# Patient Record
Sex: MALE | NOT HISPANIC OR LATINO | Employment: FULL TIME | URBAN - METROPOLITAN AREA
[De-identification: names, ages, dates, MRNs, and addresses within clinical notes are randomized per-mention and may not be internally consistent; named-entity substitution may affect disease eponyms.]

---

## 2018-12-18 ENCOUNTER — APPOINTMENT (OUTPATIENT)
Dept: RADIOLOGY | Age: 58
End: 2018-12-18
Payer: OTHER MISCELLANEOUS

## 2018-12-18 ENCOUNTER — APPOINTMENT (OUTPATIENT)
Dept: URGENT CARE | Age: 58
End: 2018-12-18
Payer: OTHER MISCELLANEOUS

## 2018-12-18 ENCOUNTER — TRANSCRIBE ORDERS (OUTPATIENT)
Dept: ADMINISTRATIVE | Age: 58
End: 2018-12-18

## 2018-12-18 DIAGNOSIS — T14.90XA INJURY: ICD-10-CM

## 2018-12-18 DIAGNOSIS — T14.90XA INJURY: Primary | ICD-10-CM

## 2018-12-18 PROCEDURE — G0383 LEV 4 HOSP TYPE B ED VISIT: HCPCS | Performed by: PREVENTIVE MEDICINE

## 2018-12-18 PROCEDURE — 73080 X-RAY EXAM OF ELBOW: CPT

## 2018-12-18 PROCEDURE — 99284 EMERGENCY DEPT VISIT MOD MDM: CPT | Performed by: PREVENTIVE MEDICINE

## 2018-12-21 ENCOUNTER — APPOINTMENT (OUTPATIENT)
Dept: URGENT CARE | Age: 58
End: 2018-12-21
Payer: OTHER MISCELLANEOUS

## 2018-12-21 PROCEDURE — 99213 OFFICE O/P EST LOW 20 MIN: CPT | Performed by: PREVENTIVE MEDICINE

## 2018-12-27 ENCOUNTER — APPOINTMENT (OUTPATIENT)
Dept: URGENT CARE | Age: 58
End: 2018-12-27
Payer: OTHER MISCELLANEOUS

## 2018-12-27 PROCEDURE — 99213 OFFICE O/P EST LOW 20 MIN: CPT | Performed by: PREVENTIVE MEDICINE

## 2024-07-11 ENCOUNTER — OFFICE VISIT (OUTPATIENT)
Dept: SURGERY | Facility: CLINIC | Age: 64
End: 2024-07-11
Payer: COMMERCIAL

## 2024-07-11 VITALS
HEIGHT: 74 IN | HEART RATE: 88 BPM | WEIGHT: 190 LBS | DIASTOLIC BLOOD PRESSURE: 76 MMHG | BODY MASS INDEX: 24.38 KG/M2 | SYSTOLIC BLOOD PRESSURE: 145 MMHG

## 2024-07-11 DIAGNOSIS — Z95.2 HISTORY OF AORTIC VALVE REPLACEMENT: ICD-10-CM

## 2024-07-11 DIAGNOSIS — K40.90 LEFT INGUINAL HERNIA: Primary | ICD-10-CM

## 2024-07-11 PROCEDURE — 99244 OFF/OP CNSLTJ NEW/EST MOD 40: CPT | Performed by: SURGERY

## 2024-07-11 RX ORDER — WARFARIN SODIUM 5 MG/1
TABLET ORAL
COMMUNITY
Start: 2024-06-27

## 2024-07-11 RX ORDER — PRAVASTATIN SODIUM 80 MG/1
80 TABLET ORAL DAILY
COMMUNITY
Start: 2024-06-13

## 2024-07-11 NOTE — LETTER
July 11, 2024     Vanessa Eckert MD  [address] [facility]  [address]  [facility] [address]    Patient: Faisal Garrido   YOB: 1960   Date of Visit: 7/11/2024       Dear Dr. Eckert:    Thank you for referring Faisal Garrido to me for evaluation. Below are my notes for this consultation.    If you have questions, please do not hesitate to call me. I look forward to following your patient along with you.         Sincerely,        Krystian Hyde, DO        CC: MD Krystian Alonso,   7/11/2024 10:31 AM  Sign when Signing Visit  Assessment/Plan:    Diagnoses and all orders for this visit:    Left inguinal hernia    History of aortic valve replacement    Other orders  -     pravastatin (PRAVACHOL) 80 mg tablet; Take 80 mg by mouth daily  -     warfarin (COUMADIN) 5 mg tablet; TAKE 1.5 TABLETS (7.5 MG TOTAL) BY MOUTH NIGHTLY    Discussed risks and benefits of an open left inguinal hernia repair including the potential of bleeding/hematoma, recurrence, spermatic cord injury, or pain issues and he agrees to proceed.  Favor an open repair as opposed to robotic to assure good hemostasis given his underlying anticoagulation use.    He follows with a Dr. Gianfranco Colmenares at [facility] burning cardiac status and his AVR.  Dr. Colmenares has managed bridging Lovenox in the past for other procedures.  I have asked Mr. Nguyen to reach out to Dr. Colmenares for anticoagulation management.  Ideally would like him to hold the Coumadin for at least 3 days preoperatively.    Will need updated CBC, BMP, EKG.    Subjective:      Patient ID: Faisal Garrido is a 64 y.o. male.    Patient presents for left inguinal hernia consult.  States he has had a bulge and intermittent sharp pain LLQ for 2 weeks.  Does not limit his activities.    States the lump disappears overnight when sleeping but returns with some achiness as the day progresses.    Had an aortic valve replacement in 1999.  Follows with a   "Gianfranco Colmenares at [facility] in [place].  He is on Coumadin for this.  He states he has been doing quite well over the last 25 years with this.  Does follow in [place] every 6 months.  Discussed pending cardiac clearance from that office        The following portions of the patient's history were reviewed and updated as appropriate:     He  has a past medical history of GERD (gastroesophageal reflux disease).  He  has a past surgical history that includes Cardiac valve replacement.  His family history is not on file.  He  reports that he has never smoked. He does not have any smokeless tobacco history on file. He reports that he does not drink alcohol and does not use drugs.  Current Outpatient Medications   Medication Sig Dispense Refill   • pravastatin (PRAVACHOL) 80 mg tablet Take 80 mg by mouth daily     • warfarin (COUMADIN) 5 mg tablet TAKE 1.5 TABLETS (7.5 MG TOTAL) BY MOUTH NIGHTLY       No current facility-administered medications for this visit.     He has No Known Allergies..    Review of Systems   Gastrointestinal:  Positive for abdominal pain.   All other systems reviewed and are negative.        Objective:      /76   Pulse 88   Ht 6' 2\" (1.88 m)   Wt 86.2 kg (190 lb)   BMI 24.39 kg/m²          Physical Exam  Constitutional:       General: He is not in acute distress.  HENT:      Head: Atraumatic.      Nose: Nose normal.      Mouth/Throat:      Mouth: Mucous membranes are moist.   Eyes:      Extraocular Movements: Extraocular movements intact.   Cardiovascular:      Rate and Rhythm: Normal rate.   Pulmonary:      Effort: Pulmonary effort is normal.   Abdominal:      General: Abdomen is flat.      Palpations: Abdomen is soft.      Hernia: A hernia (Left inguinal hernia noted on a standing examination.  No signs of a right inguinal nor umbilical hernia) is present.   Musculoskeletal:      Cervical back: Normal range of motion.   Skin:     General: Skin is warm and dry.   Neurological: "      Mental Status: He is alert.

## 2024-07-11 NOTE — PROGRESS NOTES
Assessment/Plan:    Diagnoses and all orders for this visit:    Left inguinal hernia    History of aortic valve replacement    Other orders  -     pravastatin (PRAVACHOL) 80 mg tablet; Take 80 mg by mouth daily  -     warfarin (COUMADIN) 5 mg tablet; TAKE 1.5 TABLETS (7.5 MG TOTAL) BY MOUTH NIGHTLY    Discussed risks and benefits of an open left inguinal hernia repair including the potential of bleeding/hematoma, recurrence, spermatic cord injury, or pain issues and he agrees to proceed.  Favor an open repair as opposed to robotic to assure good hemostasis given his underlying anticoagulation use.    He follows with a Dr. Gianfranco Colmenares at [facility] burning cardiac status and his AVR.  Dr. Colmenares has managed bridging Lovenox in the past for other procedures.  I have asked Mr. Nguyen to reach out to Dr. Colmenares for anticoagulation management.  Ideally would like him to hold the Coumadin for at least 3 days preoperatively.    Will need updated CBC, BMP, EKG.    Subjective:      Patient ID: Faisal Garrido is a 64 y.o. male.    Patient presents for left inguinal hernia consult.  States he has had a bulge and intermittent sharp pain LLQ for 2 weeks.  Does not limit his activities.    States the lump disappears overnight when sleeping but returns with some achiness as the day progresses.    Had an aortic valve replacement in 1999.  Follows with a Dr. Gianfranco Colmenares at [facility] in [place].  He is on Coumadin for this.  He states he has been doing quite well over the last 25 years with this.  Does follow in [place] every 6 months.  Discussed pending cardiac clearance from that office        The following portions of the patient's history were reviewed and updated as appropriate:     He  has a past medical history of GERD (gastroesophageal reflux disease).  He  has a past surgical history that includes Cardiac valve replacement.  His family history is not on file.  He  reports that he has never  "smoked. He does not have any smokeless tobacco history on file. He reports that he does not drink alcohol and does not use drugs.  Current Outpatient Medications   Medication Sig Dispense Refill    pravastatin (PRAVACHOL) 80 mg tablet Take 80 mg by mouth daily      warfarin (COUMADIN) 5 mg tablet TAKE 1.5 TABLETS (7.5 MG TOTAL) BY MOUTH NIGHTLY       No current facility-administered medications for this visit.     He has No Known Allergies..    Review of Systems   Gastrointestinal:  Positive for abdominal pain.   All other systems reviewed and are negative.        Objective:      /76   Pulse 88   Ht 6' 2\" (1.88 m)   Wt 86.2 kg (190 lb)   BMI 24.39 kg/m²          Physical Exam  Constitutional:       General: He is not in acute distress.  HENT:      Head: Atraumatic.      Nose: Nose normal.      Mouth/Throat:      Mouth: Mucous membranes are moist.   Eyes:      Extraocular Movements: Extraocular movements intact.   Cardiovascular:      Rate and Rhythm: Normal rate.   Pulmonary:      Effort: Pulmonary effort is normal.   Abdominal:      General: Abdomen is flat.      Palpations: Abdomen is soft.      Hernia: A hernia (Left inguinal hernia noted on a standing examination.  No signs of a right inguinal nor umbilical hernia) is present.   Musculoskeletal:      Cervical back: Normal range of motion.   Skin:     General: Skin is warm and dry.   Neurological:      Mental Status: He is alert.         "

## 2024-10-01 ENCOUNTER — TELEPHONE (OUTPATIENT)
Age: 64
End: 2024-10-01

## 2024-10-02 ENCOUNTER — OFFICE VISIT (OUTPATIENT)
Dept: SURGERY | Facility: CLINIC | Age: 64
End: 2024-10-02
Payer: COMMERCIAL

## 2024-10-02 DIAGNOSIS — K40.90 LEFT INGUINAL HERNIA: Primary | ICD-10-CM

## 2024-10-02 DIAGNOSIS — Z95.2 HISTORY OF AORTIC VALVE REPLACEMENT: ICD-10-CM

## 2024-10-02 PROCEDURE — 99214 OFFICE O/P EST MOD 30 MIN: CPT | Performed by: SURGERY

## 2024-10-02 NOTE — ASSESSMENT & PLAN NOTE
He has been managed by Dr. Gianfranco Colmenares at [facility] regarding his aortic valve replacement.  He states Lovenox bridging is typically recommended for any type of procedure.  He did message Dr. Colmenares and is awaiting his response regarding guidance for Lovenox bridging.  I also requested a cardiac clearance     Patient will need CBC, BMP, EKG preoperatively.

## 2024-10-02 NOTE — LETTER
2024     Vanessa Eckert MD   [facility]  [address]  [facility] [address]    Patient: Faisal Garrido   YOB: 1960   Date of Visit: 10/2/2024       Dear Dr. Eckert:    Thank you for referring Faisal Garrido to me for evaluation. Below are my notes for this consultation.    If you have questions, please do not hesitate to call me. I look forward to following your patient along with you.         Sincerely,        Krystian Hyde DO        CC: MD Krystian Alonso DO  10/2/2024  4:52 PM  Sign when Signing Visit  Ambulatory Visit  Name: Faisal Garrido      : 1960      MRN: [id number]  Encounter Provider: Krystian Hyde DO  Encounter Date: 10/2/2024   Encounter department: [facility] GENERAL SURGERY [facility]    Assessment & Plan  Left inguinal hernia  Reviewed risks and benefits of a open left inguinal hernia repair including potential for recurrence, spermatic cord injury, or pain issues and he agrees to proceed.  Given his anticoagulation status I favor open versus robotic repair       History of aortic valve replacement  He has been managed by Dr. Gianfranco Colmenares at [facility] regarding his aortic valve replacement.  He states Lovenox bridging is typically recommended for any type of procedure.  He did message Dr. Colmenares and is awaiting his response regarding guidance for Lovenox bridging.  I also requested a cardiac clearance     Patient will need CBC, BMP, EKG preoperatively.    History of Present Illness    Faisal Garrido is a 64 y.o. male who presents for follow up on left inguinal hernia to discuss repair.  No particular change since I last saw him in July.  He only messaged Dr. Colmenares this morning and has not heard back from him.    Stressed the need to hear from Dr. Colmenares regarding cardiac clearance as well as instructions for Lovenox bridging (usually handled through that office) regarding his Coumadin and AVR.    History  obtained from : patient  Review of Systems   All other systems reviewed and are negative.    Medical History Reviewed by provider this encounter:           Objective    There were no vitals taken for this visit.    Physical Exam  Constitutional:       General: He is not in acute distress.  HENT:      Head: Atraumatic.      Mouth/Throat:      Mouth: Mucous membranes are moist.   Eyes:      Extraocular Movements: Extraocular movements intact.   Cardiovascular:      Rate and Rhythm: Normal rate.   Pulmonary:      Effort: Pulmonary effort is normal.   Abdominal:      General: Abdomen is flat.      Palpations: Abdomen is soft.      Hernia: A hernia (Soft reducible left inguinal hernia.) is present.   Musculoskeletal:      Cervical back: Normal range of motion.   Skin:     General: Skin is warm and dry.   Neurological:      Mental Status: He is alert.

## 2024-10-02 NOTE — H&P (VIEW-ONLY)
Ambulatory Visit  Name: Faisal Garrido      : 1960      MRN: [id number]  Encounter Provider: Krystian Hyde DO  Encounter Date: 10/2/2024   Encounter department: [facility] GENERAL SURGERY [facility]    Assessment & Plan  Left inguinal hernia  Reviewed risks and benefits of a open left inguinal hernia repair including potential for recurrence, spermatic cord injury, or pain issues and he agrees to proceed.  Given his anticoagulation status I favor open versus robotic repair       History of aortic valve replacement  He has been managed by Dr. Gianfranco Colmenares at [facility] regarding his aortic valve replacement.  He states Lovenox bridging is typically recommended for any type of procedure.  He did message Dr. Colmenares and is awaiting his response regarding guidance for Lovenox bridging.  I also requested a cardiac clearance     Patient will need CBC, BMP, EKG preoperatively.    History of Present Illness     Faisal Garrido is a 64 y.o. male who presents for follow up on left inguinal hernia to discuss repair.  No particular change since I last saw him in July.  He only messaged Dr. Colmenares this morning and has not heard back from him.    Stressed the need to hear from Dr. Colmenares regarding cardiac clearance as well as instructions for Lovenox bridging (usually handled through that office) regarding his Coumadin and AVR.    History obtained from : patient  Review of Systems   All other systems reviewed and are negative.    Medical History Reviewed by provider this encounter:           Objective     There were no vitals taken for this visit.    Physical Exam  Constitutional:       General: He is not in acute distress.  HENT:      Head: Atraumatic.      Mouth/Throat:      Mouth: Mucous membranes are moist.   Eyes:      Extraocular Movements: Extraocular movements intact.   Cardiovascular:      Rate and Rhythm: Normal rate.   Pulmonary:      Effort: Pulmonary effort is normal.   Abdominal:       General: Abdomen is flat.      Palpations: Abdomen is soft.      Hernia: A hernia (Soft reducible left inguinal hernia.) is present.   Musculoskeletal:      Cervical back: Normal range of motion.   Skin:     General: Skin is warm and dry.   Neurological:      Mental Status: He is alert.

## 2024-10-02 NOTE — ASSESSMENT & PLAN NOTE
Reviewed risks and benefits of a open left inguinal hernia repair including potential for recurrence, spermatic cord injury, or pain issues and he agrees to proceed.  Given his anticoagulation status I favor open versus robotic repair

## 2024-10-02 NOTE — PROGRESS NOTES
Ambulatory Visit  Name: Faisal Garrido      : 1960      MRN: [id number]  Encounter Provider: Krystian Hyde DO  Encounter Date: 10/2/2024   Encounter department: [facility] GENERAL SURGERY [facility]    Assessment & Plan  Left inguinal hernia  Reviewed risks and benefits of a open left inguinal hernia repair including potential for recurrence, spermatic cord injury, or pain issues and he agrees to proceed.  Given his anticoagulation status I favor open versus robotic repair       History of aortic valve replacement  He has been managed by Dr. Gianfranco Colmenares at [facility] regarding his aortic valve replacement.  He states Lovenox bridging is typically recommended for any type of procedure.  He did message Dr. Colmenares and is awaiting his response regarding guidance for Lovenox bridging.  I also requested a cardiac clearance     Patient will need CBC, BMP, EKG preoperatively.    History of Present Illness     Faisal Garrido is a 64 y.o. male who presents for follow up on left inguinal hernia to discuss repair.  No particular change since I last saw him in July.  He only messaged Dr. Colmenares this morning and has not heard back from him.    Stressed the need to hear from Dr. Colmenares regarding cardiac clearance as well as instructions for Lovenox bridging (usually handled through that office) regarding his Coumadin and AVR.    History obtained from : patient  Review of Systems   All other systems reviewed and are negative.    Medical History Reviewed by provider this encounter:           Objective     There were no vitals taken for this visit.    Physical Exam  Constitutional:       General: He is not in acute distress.  HENT:      Head: Atraumatic.      Mouth/Throat:      Mouth: Mucous membranes are moist.   Eyes:      Extraocular Movements: Extraocular movements intact.   Cardiovascular:      Rate and Rhythm: Normal rate.   Pulmonary:      Effort: Pulmonary effort is normal.   Abdominal:       General: Abdomen is flat.      Palpations: Abdomen is soft.      Hernia: A hernia (Soft reducible left inguinal hernia.) is present.   Musculoskeletal:      Cervical back: Normal range of motion.   Skin:     General: Skin is warm and dry.   Neurological:      Mental Status: He is alert.

## 2024-10-03 ENCOUNTER — PREP FOR PROCEDURE (OUTPATIENT)
Dept: SURGERY | Facility: CLINIC | Age: 64
End: 2024-10-03

## 2024-10-03 DIAGNOSIS — K40.90 INGUINAL HERNIA: Primary | ICD-10-CM

## 2024-10-16 ENCOUNTER — ANESTHESIA EVENT (OUTPATIENT)
Dept: PERIOP | Facility: HOSPITAL | Age: 64
End: 2024-10-16
Payer: COMMERCIAL

## 2024-10-17 ENCOUNTER — NURSE TRIAGE (OUTPATIENT)
Age: 64
End: 2024-10-17

## 2024-10-17 RX ORDER — FERROUS GLUCONATE 324(38)MG
324 TABLET ORAL
COMMUNITY

## 2024-10-17 RX ORDER — DIPHENOXYLATE HYDROCHLORIDE AND ATROPINE SULFATE 2.5; .025 MG/1; MG/1
1 TABLET ORAL DAILY
COMMUNITY

## 2024-10-17 NOTE — TELEPHONE ENCOUNTER
"Pt called in. Pt scheduled for Procedure: REPAIR HERNIA INGUINAL (Left: Groin) on 10/25/24 with Dr. Hyde. Pt was mailed cardiology clearance and asking if he can drop it off at the office tomorrow morning around 9:30am. Advised Pt that he can.     Reason for Disposition   Information only question and nurse able to answer    Answer Assessment - Initial Assessment Questions  1. REASON FOR CALL or QUESTION: \"What is your reason for calling today?\" or \"How can I best help you?\" or \"What question do you have that I can help answer?\"      How to drop off pre-op cardiology clearance    Protocols used: Information Only Call - No Triage-ADULT-[place]    "

## 2024-10-17 NOTE — PRE-PROCEDURE INSTRUCTIONS
Pre-Surgery Instructions:   Medication Instructions    Enoxaparin Sodium (LOVENOX SC) Hold x 4 days prior to sx. Starting Lovenox x 3 days prior-    ferrous gluconate (FERGON) 324 mg tablet Hold day of surgery.    Flaxseed, Linseed, (FLAXSEED OIL PO) Stop taking 7 days prior to surgery.    multivitamin (THERAGRAN) TABS Stop taking 7 days prior to surgery.    pravastatin (PRAVACHOL) 80 mg tablet Take night before surgery    warfarin (COUMADIN) 5 mg tablet Hold x 4 days prior to sx. Starting Lovenox x 3 days prior-      Medication instructions for day surgery reviewed. Please use only a sip of water to take your instructed medications. Avoid all over the counter vitamins, supplements and NSAIDS for one week prior to surgery per anesthesia guidelines. Tylenol is ok to take as needed.     You will receive a call one business day prior to surgery with an arrival time and hospital directions. If your surgery is scheduled on a Monday, the hospital will be calling you on the Friday prior to your surgery. If you have not heard from anyone by 8pm, please call the hospital supervisor through the hospital  at [phone]. ([place] [phone] or [place] [phone]).    Do not eat or drink anything after midnight the night before your surgery, including candy, mints, lifesavers, or chewing gum. Do not drink alcohol 24hrs before your surgery. Try not to smoke at least 24hrs before your surgery.       Follow the pre surgery showering instructions as listed in the “My Surgical Experience Booklet” or otherwise provided by your surgeon's office. Do not use a blade to shave the surgical area 1 week before surgery. It is okay to use a clean electric clippers up to 24 hours before surgery. Do not apply any lotions, creams, including makeup, cologne, deodorant, or perfumes after showering on the day of your surgery. Do not use dry shampoo, hair spray, hair gel, or any type of hair products.     No contact lenses, eye  make-up, or artificial eyelashes. Remove nail polish, including gel polish, and any artificial, gel, or acrylic nails if possible. Remove all jewelry including rings and body piercing jewelry.     Wear causal clothing that is easy to take on and off. Consider your type of surgery.    Keep any valuables, jewelry, piercings at home. Please bring any specially ordered equipment (sling, braces) if indicated.    Arrange for a responsible person to drive you to and from the hospital on the day of your surgery. Please confirm the visitor policy for the day of your procedure when you receive your phone call with an arrival time.     Call the surgeon's office with any new illnesses, exposures, or additional questions prior to surgery.    Please reference your “My Surgical Experience Booklet” for additional information to prepare for your upcoming surgery.

## 2024-10-18 ENCOUNTER — TELEPHONE (OUTPATIENT)
Age: 64
End: 2024-10-18

## 2024-10-20 ENCOUNTER — LAB (OUTPATIENT)
Dept: LAB | Age: 64
End: 2024-10-20
Payer: COMMERCIAL

## 2024-10-20 ENCOUNTER — APPOINTMENT (OUTPATIENT)
Dept: LAB | Age: 64
End: 2024-10-20
Payer: COMMERCIAL

## 2024-10-20 DIAGNOSIS — K40.90 INGUINAL HERNIA: ICD-10-CM

## 2024-10-20 LAB
ANION GAP SERPL CALCULATED.3IONS-SCNC: 6 MMOL/L (ref 4–13)
BUN SERPL-MCNC: 15 MG/DL (ref 5–25)
CALCIUM SERPL-MCNC: 8.7 MG/DL (ref 8.4–10.2)
CHLORIDE SERPL-SCNC: 106 MMOL/L (ref 96–108)
CO2 SERPL-SCNC: 29 MMOL/L (ref 21–32)
CREAT SERPL-MCNC: 0.78 MG/DL (ref 0.6–1.3)
ERYTHROCYTE [DISTWIDTH] IN BLOOD BY AUTOMATED COUNT: 12.7 % (ref 11.6–15.1)
GFR SERPL CREATININE-BSD FRML MDRD: 96 ML/MIN/1.73SQ M
GLUCOSE SERPL-MCNC: 83 MG/DL (ref 65–140)
HCT VFR BLD AUTO: 41.7 % (ref 36.5–49.3)
HGB BLD-MCNC: 13.5 G/DL (ref 12–17)
MCH RBC QN AUTO: 31.5 PG (ref 26.8–34.3)
MCHC RBC AUTO-ENTMCNC: 32.4 G/DL (ref 31.4–37.4)
MCV RBC AUTO: 97 FL (ref 82–98)
PLATELET # BLD AUTO: 226 THOUSANDS/UL (ref 149–390)
PMV BLD AUTO: 11.6 FL (ref 8.9–12.7)
POTASSIUM SERPL-SCNC: 4.1 MMOL/L (ref 3.5–5.3)
RBC # BLD AUTO: 4.29 MILLION/UL (ref 3.88–5.62)
SODIUM SERPL-SCNC: 141 MMOL/L (ref 135–147)
WBC # BLD AUTO: 5.47 THOUSAND/UL (ref 4.31–10.16)

## 2024-10-20 PROCEDURE — 80048 BASIC METABOLIC PNL TOTAL CA: CPT

## 2024-10-20 PROCEDURE — 36415 COLL VENOUS BLD VENIPUNCTURE: CPT

## 2024-10-20 PROCEDURE — 85027 COMPLETE CBC AUTOMATED: CPT

## 2024-10-20 PROCEDURE — 93005 ELECTROCARDIOGRAM TRACING: CPT

## 2024-10-22 DIAGNOSIS — K40.90 LEFT INGUINAL HERNIA: Primary | ICD-10-CM

## 2024-10-22 LAB
ATRIAL RATE: 80 BPM
PR INTERVAL: 182 MS
QRS AXIS: 137 DEGREES
QRSD INTERVAL: 64 MS
QT INTERVAL: 352 MS
QTC INTERVAL: 405 MS
T WAVE AXIS: 139 DEGREES
VENTRICULAR RATE: 80 BPM

## 2024-10-22 PROCEDURE — 93010 ELECTROCARDIOGRAM REPORT: CPT | Performed by: INTERNAL MEDICINE

## 2024-10-22 RX ORDER — TAMSULOSIN HYDROCHLORIDE 0.4 MG/1
0.4 CAPSULE ORAL
Qty: 10 CAPSULE | Refills: 0 | Status: SHIPPED | OUTPATIENT
Start: 2024-10-22

## 2024-10-22 NOTE — PROGRESS NOTES
Prescription for Flomax sent to his pharmacy ([facility]) in anticipation for hernia surgery on 10/25.  I did leave a voicemail with him regarding this medication.  Also saw clearance letter and Lovenox instructions from his cardiologist it has been scanned into his EPIC chart

## 2024-10-25 ENCOUNTER — HOSPITAL ENCOUNTER (OUTPATIENT)
Facility: HOSPITAL | Age: 64
Setting detail: OUTPATIENT SURGERY
Discharge: HOME | End: 2024-10-25
Attending: SURGERY | Admitting: SURGERY
Payer: COMMERCIAL

## 2024-10-25 ENCOUNTER — ANESTHESIA (OUTPATIENT)
Dept: PERIOP | Facility: HOSPITAL | Age: 64
End: 2024-10-25
Payer: COMMERCIAL

## 2024-10-25 VITALS
HEIGHT: 74 IN | HEART RATE: 72 BPM | SYSTOLIC BLOOD PRESSURE: 124 MMHG | TEMPERATURE: 97.3 F | RESPIRATION RATE: 18 BRPM | WEIGHT: 190 LBS | DIASTOLIC BLOOD PRESSURE: 53 MMHG | OXYGEN SATURATION: 98 % | BODY MASS INDEX: 24.38 KG/M2

## 2024-10-25 DIAGNOSIS — K40.90 LEFT INGUINAL HERNIA: Primary | ICD-10-CM

## 2024-10-25 LAB
APTT PPP: 32 SECONDS (ref 23–34)
INR PPP: 1.12 (ref 0.85–1.19)
PROTHROMBIN TIME: 15.2 SECONDS (ref 12.3–15)

## 2024-10-25 PROCEDURE — 49505 PRP I/HERN INIT REDUC >5 YR: CPT | Performed by: SURGERY

## 2024-10-25 PROCEDURE — 49505 PRP I/HERN INIT REDUC >5 YR: CPT | Performed by: PHYSICIAN ASSISTANT

## 2024-10-25 PROCEDURE — 85730 THROMBOPLASTIN TIME PARTIAL: CPT | Performed by: ANESTHESIOLOGY

## 2024-10-25 PROCEDURE — C1781 MESH (IMPLANTABLE): HCPCS | Performed by: SURGERY

## 2024-10-25 PROCEDURE — 85610 PROTHROMBIN TIME: CPT | Performed by: ANESTHESIOLOGY

## 2024-10-25 DEVICE — PERFIX PLUG, EXTRA LARGE (CONTENTS: 2)
Type: IMPLANTABLE DEVICE | Site: INGUINAL | Status: FUNCTIONAL
Brand: PERFIX

## 2024-10-25 RX ORDER — ONDANSETRON 2 MG/ML
4 INJECTION INTRAMUSCULAR; INTRAVENOUS ONCE AS NEEDED
Status: DISCONTINUED | OUTPATIENT
Start: 2024-10-25 | End: 2024-10-25 | Stop reason: HOSPADM

## 2024-10-25 RX ORDER — MIDAZOLAM HYDROCHLORIDE 2 MG/2ML
INJECTION, SOLUTION INTRAMUSCULAR; INTRAVENOUS AS NEEDED
Status: DISCONTINUED | OUTPATIENT
Start: 2024-10-25 | End: 2024-10-25

## 2024-10-25 RX ORDER — HYDROMORPHONE HCL/PF 1 MG/ML
0.5 SYRINGE (ML) INJECTION
Status: DISCONTINUED | OUTPATIENT
Start: 2024-10-25 | End: 2024-10-25 | Stop reason: HOSPADM

## 2024-10-25 RX ORDER — ONDANSETRON 2 MG/ML
4 INJECTION INTRAMUSCULAR; INTRAVENOUS EVERY 4 HOURS PRN
Status: DISCONTINUED | OUTPATIENT
Start: 2024-10-25 | End: 2024-10-25 | Stop reason: HOSPADM

## 2024-10-25 RX ORDER — PROPOFOL 10 MG/ML
INJECTION, EMULSION INTRAVENOUS AS NEEDED
Status: DISCONTINUED | OUTPATIENT
Start: 2024-10-25 | End: 2024-10-25

## 2024-10-25 RX ORDER — MAGNESIUM HYDROXIDE 1200 MG/15ML
LIQUID ORAL AS NEEDED
Status: DISCONTINUED | OUTPATIENT
Start: 2024-10-25 | End: 2024-10-25 | Stop reason: HOSPADM

## 2024-10-25 RX ORDER — FENTANYL CITRATE/PF 50 MCG/ML
25 SYRINGE (ML) INJECTION
Status: DISCONTINUED | OUTPATIENT
Start: 2024-10-25 | End: 2024-10-25 | Stop reason: HOSPADM

## 2024-10-25 RX ORDER — BUPIVACAINE HYDROCHLORIDE AND EPINEPHRINE 2.5; 5 MG/ML; UG/ML
INJECTION, SOLUTION EPIDURAL; INFILTRATION; INTRACAUDAL; PERINEURAL AS NEEDED
Status: DISCONTINUED | OUTPATIENT
Start: 2024-10-25 | End: 2024-10-25 | Stop reason: HOSPADM

## 2024-10-25 RX ORDER — ONDANSETRON 2 MG/ML
INJECTION INTRAMUSCULAR; INTRAVENOUS AS NEEDED
Status: DISCONTINUED | OUTPATIENT
Start: 2024-10-25 | End: 2024-10-25

## 2024-10-25 RX ORDER — SODIUM CHLORIDE, SODIUM LACTATE, POTASSIUM CHLORIDE, CALCIUM CHLORIDE 600; 310; 30; 20 MG/100ML; MG/100ML; MG/100ML; MG/100ML
125 INJECTION, SOLUTION INTRAVENOUS CONTINUOUS
Status: DISCONTINUED | OUTPATIENT
Start: 2024-10-25 | End: 2024-10-25 | Stop reason: HOSPADM

## 2024-10-25 RX ORDER — OXYCODONE HYDROCHLORIDE 5 MG/1
5 TABLET ORAL EVERY 4 HOURS PRN
Status: DISCONTINUED | OUTPATIENT
Start: 2024-10-25 | End: 2024-10-25 | Stop reason: HOSPADM

## 2024-10-25 RX ORDER — OXYCODONE HYDROCHLORIDE 5 MG/1
5 TABLET ORAL EVERY 4 HOURS PRN
Qty: 10 TABLET | Refills: 0 | Status: SHIPPED | OUTPATIENT
Start: 2024-10-25 | End: 2024-11-04

## 2024-10-25 RX ORDER — LIDOCAINE HYDROCHLORIDE 10 MG/ML
INJECTION, SOLUTION EPIDURAL; INFILTRATION; INTRACAUDAL; PERINEURAL AS NEEDED
Status: DISCONTINUED | OUTPATIENT
Start: 2024-10-25 | End: 2024-10-25

## 2024-10-25 RX ORDER — DEXAMETHASONE SODIUM PHOSPHATE 10 MG/ML
INJECTION, SOLUTION INTRAMUSCULAR; INTRAVENOUS AS NEEDED
Status: DISCONTINUED | OUTPATIENT
Start: 2024-10-25 | End: 2024-10-25

## 2024-10-25 RX ORDER — FENTANYL CITRATE 50 UG/ML
INJECTION, SOLUTION INTRAMUSCULAR; INTRAVENOUS AS NEEDED
Status: DISCONTINUED | OUTPATIENT
Start: 2024-10-25 | End: 2024-10-25

## 2024-10-25 RX ORDER — SODIUM CHLORIDE, SODIUM LACTATE, POTASSIUM CHLORIDE, CALCIUM CHLORIDE 600; 310; 30; 20 MG/100ML; MG/100ML; MG/100ML; MG/100ML
INJECTION, SOLUTION INTRAVENOUS CONTINUOUS PRN
Status: DISCONTINUED | OUTPATIENT
Start: 2024-10-25 | End: 2024-10-25

## 2024-10-25 RX ORDER — CEFAZOLIN SODIUM 2 G/50ML
2000 SOLUTION INTRAVENOUS ONCE
Status: COMPLETED | OUTPATIENT
Start: 2024-10-25 | End: 2024-10-25

## 2024-10-25 RX ADMIN — FENTANYL CITRATE 25 MCG: 50 INJECTION INTRAMUSCULAR; INTRAVENOUS at 12:09

## 2024-10-25 RX ADMIN — PROPOFOL 170 MG: 10 INJECTION, EMULSION INTRAVENOUS at 11:07

## 2024-10-25 RX ADMIN — DEXAMETHASONE SODIUM PHOSPHATE 10 MG: 10 INJECTION, SOLUTION INTRAMUSCULAR; INTRAVENOUS at 11:09

## 2024-10-25 RX ADMIN — ONDANSETRON 4 MG: 2 INJECTION INTRAMUSCULAR; INTRAVENOUS at 11:07

## 2024-10-25 RX ADMIN — FENTANYL CITRATE 25 MCG: 50 INJECTION INTRAMUSCULAR; INTRAVENOUS at 12:03

## 2024-10-25 RX ADMIN — FENTANYL CITRATE 25 MCG: 50 INJECTION INTRAMUSCULAR; INTRAVENOUS at 11:54

## 2024-10-25 RX ADMIN — FENTANYL CITRATE 25 MCG: 50 INJECTION INTRAMUSCULAR; INTRAVENOUS at 11:25

## 2024-10-25 RX ADMIN — OXYCODONE HYDROCHLORIDE 5 MG: 5 TABLET ORAL at 12:58

## 2024-10-25 RX ADMIN — FENTANYL CITRATE 25 MCG: 50 INJECTION INTRAMUSCULAR; INTRAVENOUS at 11:17

## 2024-10-25 RX ADMIN — SODIUM CHLORIDE, SODIUM LACTATE, POTASSIUM CHLORIDE, AND CALCIUM CHLORIDE: .6; .31; .03; .02 INJECTION, SOLUTION INTRAVENOUS at 11:03

## 2024-10-25 RX ADMIN — MIDAZOLAM 2 MG: 1 INJECTION INTRAMUSCULAR; INTRAVENOUS at 11:03

## 2024-10-25 RX ADMIN — CEFAZOLIN SODIUM 2000 MG: 2 SOLUTION INTRAVENOUS at 11:09

## 2024-10-25 RX ADMIN — FENTANYL CITRATE 25 MCG: 50 INJECTION INTRAMUSCULAR; INTRAVENOUS at 11:07

## 2024-10-25 RX ADMIN — LIDOCAINE HYDROCHLORIDE 50 MG: 10 INJECTION, SOLUTION EPIDURAL; INFILTRATION; INTRACAUDAL at 11:07

## 2024-10-25 NOTE — ANESTHESIA PREPROCEDURE EVALUATION
Procedure:  REPAIR HERNIA INGUINAL (Left: Groin)    Relevant Problems   CARDIO   (+) History of aortic valve replacement        Physical Exam    Airway    Mallampati score: II  TM Distance: >3 FB  Neck ROM: full     Dental       Cardiovascular  Cardiovascular exam normal    Pulmonary  Pulmonary exam normal     Other Findings        Anesthesia Plan  ASA Score- 3     Anesthesia Type- general with ASA Monitors.         Additional Monitors:     Airway Plan: LMA.           Plan Factors-Exercise tolerance (METS): >4 METS.    Chart reviewed. EKG reviewed.  Existing labs reviewed. Patient summary reviewed.    Patient is not a current smoker.  Patient did not smoke on day of surgery.    Obstructive sleep apnea risk education given perioperatively.        Induction- intravenous.    Postoperative Plan- Plan for postoperative opioid use. Planned trial extubation    Perioperative Resuscitation Plan - Level 1 - Full Code.       Informed Consent- Anesthetic plan and risks discussed with patient and spouse.  I personally reviewed this patient with the CRNA. Discussed and agreed on the Anesthesia Plan with the CRNA..

## 2024-10-25 NOTE — ANESTHESIA POSTPROCEDURE EVALUATION
Post-Op Assessment Note    CV Status:  Stable  Pain Score: 0    Pain management: adequate       Mental Status:  Sleepy   Hydration Status:  Euvolemic   PONV Controlled:  Controlled   Airway Patency:  Patent     Post Op Vitals Reviewed: Yes    No anethesia notable event occurred.    Staff: CRNA           Last Filed PACU Vitals:  Vitals Value Taken Time   Temp     Pulse 84    /64    Resp 14    SpO2 97%        Modified Sandra:  No data recorded

## 2024-10-25 NOTE — ANESTHESIA POSTPROCEDURE EVALUATION
Post-Op Assessment Note          Two or more mitigation strategies used for obstructive sleep apnea  No anethesia notable event occurred.    Staff: Anesthesiologist           Last Filed PACU Vitals:  Vitals Value Taken Time   Temp 97.5 °F (36.4 °C) 10/25/24 1230   Pulse 68 10/25/24 1240   /58 10/25/24 1230   Resp 9 10/25/24 1240   SpO2 96 % 10/25/24 1240   Vitals shown include unfiled device data.    Modified Sandra:  Activity: 2 (10/25/2024  2:00 PM)  Respiration: 2 (10/25/2024  2:00 PM)  Circulation: 2 (10/25/2024  2:00 PM)  Consciousness: 2 (10/25/2024  2:00 PM)  Oxygen Saturation: 2 (10/25/2024  2:00 PM)  Modified Sandra Score: 10 (10/25/2024  2:00 PM)

## 2024-10-25 NOTE — OP NOTE
OPERATIVE REPORT  PATIENT NAME: Faisal Garrido    :  1960  MRN: [id number]  Pt Location: [facility] 04    SURGERY DATE: 10/25/2024    Surgeons and Role:     * Krystian Hyde, DO - Primary     * Genna Morgan PA-C - Assisting  I was present for the entire procedure. A qualified resident physician was not available, and a physician assistant was necessary to provide expert assistance in the form of providing optimal exposure with retraction, suturing, and assistance with dissection in order to perform the most efficient operation and in order to optimize patient safety in the absence of a qualified surgical resident.      Preop Diagnosis:  Inguinal hernia [K40.90], left    Post-Op Diagnosis Codes:     * Inguinal hernia [K40.90], left, indirect    Procedure(s):  Left - REPAIR HERNIA INGUINAL with extra-large plug and patch    Specimen(s):  * No specimens in log *    Estimated Blood Loss:   Minimal    Drains:  * No LDAs found *    Anesthesia Type:   General/local    Operative Indications:  Inguinal hernia [K40.90]      Operative Findings:  Left indirect inguinal hernia.  Height 74 inches weight 86 kg / 190 pounds.  BMI 24.  ASA 3.  Wound class I      Complications:   None    Procedure and Technique:  Patient was brought the operative suite and identified by visualization, conversation, by armband.  Sequential compression pumps were placed.  He was given Ancef perioperatively.  Once under anesthesia left groin was prepped and draped in a sterile fashion.  Timeout was performed and was assured that the prep was dry.  Local was instilled over the left inguinal canal.  An oblique skin incision was made and the subcutaneous tissues were divided with hot cautery to visualize the external oblique fascia.  External ring was noted and this fascia was opened up to the level of the internal ring.  Venkatesh retractors placed for exposure.  Cremasterics fibers were then skeletonized off the cord structures.   Cord structures were encircled with a Penrose drain.  Moderately sized cord lipoma as well as an indirect sac were noted and these were dissected out in a high fashion.  They were reduced into the internal ring defect.  This defect was developed with a 4 x 4 sponge to allow room for the plug.  Extra-large plug was placed into the defect.  Inner leads were anchored to each side of the transversalis tissues with 2-0 Vicryl suture.  Defect was then closed on top of the plug.  Onlay mesh was then placed on the floor of the canal likely 2 Vicryl to the pubic tubercle, shelving edge, and conjoined tendon.  Did extend the keyhole incision to allow room for the cord structures.  The 2 tails were brought around the cord and anchored to themselves as well as the underlying transversalis tissues with 2-0 Vicryl.  Irrigations carried out.  Local was instilled external bleak fascia was then reapproximated on top of the cord with a running 2-0 Vicryl suture.  Luis's was reapproximated with simple 2-0 Vicryl sutures.  Skin was closed using 4-0 Monocryl in a subcuticular fashion.  Wounds and washed and dried.  Sterile skin glue was applied.  He was assured the testicles in the scrotum at the completion the case.  He was awakened in the operating room and returned to the recovery area in stable condition having tolerated the procedure well.   I was present for the entire procedure.    Patient Disposition:  PACU              SIGNATURE: Krystian Hyde DO  DATE: October 25, 2024  TIME: 11:44 AM

## 2024-10-25 NOTE — INTERVAL H&P NOTE
H&P reviewed. After examining the patient I find no changes in the patients condition since the H&P had been written.    Vitals:    10/25/24 1032   BP: 158/78   Pulse: 82   Resp: 16   Temp: (!) 97.2 °F (36.2 °C)   SpO2: 99%

## 2024-10-25 NOTE — DISCHARGE INSTR - AVS FIRST PAGE
Please call the office when you leave to schedule an appointment to be seen in 2-3 weeks    Activity:    Do not lift more than 25 pounds for 4 weeks post-operatively                 Walking is encouraged  Normal daily activities including climbing steps are okay  Do not engage in strenuous activity or contact sports for 4-6 weeks post-operatively    Return to work:   Return to work to be discussed at first post-operative visit    Diet:   You may return to your normal heart healthy diet    Wound Care:  Your wound is closed with skin glue  It is okay to shower. Wash incision gently with soap and water and pat dry. Do not soak incisions in bath water or swim for two weeks. Do not apply any creams or ointments.  Apply ice as needed to wound    Pain Medication:   Please take as directed  No driving while taking narcotic pain medications    Other:  If you have questions after discharge please call the office.  Resume Lovenox/Coumadin occasions as directed by her cardiologist    If you have increased pain, fever >101.5, increased drainage, redness or a bad smell at your surgery site, please call us immediately or come directly to the Emergency Room

## 2024-10-29 ENCOUNTER — PATIENT MESSAGE (OUTPATIENT)
Dept: SURGERY | Facility: CLINIC | Age: 64
End: 2024-10-29

## 2024-10-29 NOTE — PATIENT COMMUNICATION
Received call from Barb with [employer] regarding patients disability paperwork.  Advised that paperwork had been received but we are waiting on some information from the patient to complete the forms and return.  Verbalized understanding  #[phone]

## 2024-11-06 ENCOUNTER — TELEPHONE (OUTPATIENT)
Age: 64
End: 2024-11-06

## 2024-11-06 NOTE — TELEPHONE ENCOUNTER
Patient of Dr Conron calling about his return to work date after surgery on 10/25.    Advised that typically will evaluate at post op visit and determine. Patients post op is 11/13.  He stated he will let his work know that he will be seen on 11/13 and determine return then.    Stated once he does get a return to work clearance, it must be submitted 24 hours before he can return.    #[phone]

## 2024-11-13 ENCOUNTER — OFFICE VISIT (OUTPATIENT)
Dept: SURGERY | Facility: CLINIC | Age: 64
End: 2024-11-13

## 2024-11-13 DIAGNOSIS — K40.90 LEFT INGUINAL HERNIA: Primary | ICD-10-CM

## 2024-11-13 PROCEDURE — 99024 POSTOP FOLLOW-UP VISIT: CPT | Performed by: SURGERY

## 2024-11-13 NOTE — LETTER
November 13, 2024     Vanessa Eckert MD  [address]  [address]  [facility] [address]    Patient: Faisal Garrido   YOB: 1960   Date of Visit: 11/13/2024       Dear Dr. Eckert:    Thank you for referring Faisal Garrido to me for evaluation. Below are my notes for this consultation.    If you have questions, please do not hesitate to call me. I look forward to following your patient along with you.         Sincerely,        Krystian Hyde DO        CC: No Recipients    Krystian Hyde DO  11/13/2024  9:46 AM  Sign when Signing Visit  Assessment/Plan:    Diagnoses and all orders for this visit:    Left inguinal hernia    Status post left inguinal hernia repair.  Healing ridge as expected.  No signs of recurrence.  May return to work on 11/18 with 25 pound weight restriction.  May resume full duty as of 11/25      Postoperative restrictions reviewed. All questions answered.       ______________________________________________________  HPI: Patient presents post operatively.  Left inguinal hernia repair 10/25/2024 with mesh.  No particular issues.               ROS:  General ROS: negative for - chills, fatigue, fever or night sweats, weight loss  Respiratory ROS: no cough, shortness of breath, or wheezing  Cardiovascular ROS: no chest pain or dyspnea on exertion  Genito-Urinary ROS: no dysuria, trouble voiding, or hematuria  Musculoskeletal ROS: negative for - gait disturbance, joint pain or muscle pain  Neurological ROS: no TIA or stroke symptoms  GI ROS: see HPI  Skin ROS: no new rashes or lesions   Lymphatic ROS: no new adenopathy noted by pt.   GYN ROS: see HPI, no new GYN history or bleeding noted  Psy ROS: no new mental or behavioral disturbances         Patient Active Problem List   Diagnosis   • Left inguinal hernia   • History of aortic valve replacement       Allergies:  Patient has no known allergies.      Current Outpatient Medications:   •  Enoxaparin Sodium (LOVENOX SC), Inject  80 mg under the skin 2 (two) times a day Hold x 4 days prior to sx. Starting Lovenox x 3 days prior-, Disp: , Rfl:   •  ferrous gluconate (FERGON) 324 mg tablet, Take 324 mg by mouth daily with breakfast, Disp: , Rfl:   •  Flaxseed, Linseed, (FLAXSEED OIL PO), Take by mouth, Disp: , Rfl:   •  multivitamin (THERAGRAN) TABS, Take 1 tablet by mouth daily, Disp: , Rfl:   •  omeprazole (PriLOSEC) 20 mg delayed release capsule, Take 20 mg by mouth every morning, Disp: , Rfl:   •  pravastatin (PRAVACHOL) 80 mg tablet, Take 80 mg by mouth every evening, Disp: , Rfl:   •  tamsulosin (FLOMAX) 0.4 mg, Take 1 capsule (0.4 mg total) by mouth daily with dinner, Disp: 10 capsule, Rfl: 0  •  warfarin (COUMADIN) 5 mg tablet, Hold x 4 days prior to sx. Starting Lovenox x 3 days prior-, Disp: , Rfl:     Past Medical History:   Diagnosis Date   • Aortic stenosis     per pt birth defect   • GERD (gastroesophageal reflux disease)    • Heart murmur     heart valve replaced   • Hyperlipidemia        Past Surgical History:   Procedure Laterality Date   • CARDIAC VALVE REPLACEMENT     • COLONOSCOPY     • HERNIA REPAIR  11/24   • [place] RPR 1ST INGUN HRNA AGE 6 YRS/> REDUCIBLE Left 10/25/2024    Procedure: REPAIR HERNIA INGUINAL;  Surgeon: Krystian Hyde DO;  Location: [facility];  Service: General       No family history on file.     reports that he has never smoked. He has never used smokeless tobacco. He reports that he does not drink alcohol and does not use drugs.    PHYSICAL EXAM    There were no vitals taken for this visit.    General: normal, cooperative, no distress  Abdominal: soft, nondistended, or nontender  Incision: clean, dry, and intact and healing well.  Healing ridge as expected.  No signs of recurrence      Krystian Hyde DO    Date: 11/13/2024 Time: 9:45 AM

## 2024-11-13 NOTE — PROGRESS NOTES
Assessment/Plan:    Diagnoses and all orders for this visit:    Left inguinal hernia    Status post left inguinal hernia repair.  Healing ridge as expected.  No signs of recurrence.  May return to work on 11/18 with 25 pound weight restriction.  May resume full duty as of 11/25      Postoperative restrictions reviewed. All questions answered.       ______________________________________________________  HPI: Patient presents post operatively.  Left inguinal hernia repair 10/25/2024 with mesh.  No particular issues.               ROS:  General ROS: negative for - chills, fatigue, fever or night sweats, weight loss  Respiratory ROS: no cough, shortness of breath, or wheezing  Cardiovascular ROS: no chest pain or dyspnea on exertion  Genito-Urinary ROS: no dysuria, trouble voiding, or hematuria  Musculoskeletal ROS: negative for - gait disturbance, joint pain or muscle pain  Neurological ROS: no TIA or stroke symptoms  GI ROS: see HPI  Skin ROS: no new rashes or lesions   Lymphatic ROS: no new adenopathy noted by pt.   GYN ROS: see HPI, no new GYN history or bleeding noted  Psy ROS: no new mental or behavioral disturbances         Patient Active Problem List   Diagnosis    Left inguinal hernia    History of aortic valve replacement       Allergies:  Patient has no known allergies.      Current Outpatient Medications:     Enoxaparin Sodium (LOVENOX SC), Inject 80 mg under the skin 2 (two) times a day Hold x 4 days prior to sx. Starting Lovenox x 3 days prior-, Disp: , Rfl:     ferrous gluconate (FERGON) 324 mg tablet, Take 324 mg by mouth daily with breakfast, Disp: , Rfl:     Flaxseed, Linseed, (FLAXSEED OIL PO), Take by mouth, Disp: , Rfl:     multivitamin (THERAGRAN) TABS, Take 1 tablet by mouth daily, Disp: , Rfl:     omeprazole (PriLOSEC) 20 mg delayed release capsule, Take 20 mg by mouth every morning, Disp: , Rfl:     pravastatin (PRAVACHOL) 80 mg tablet, Take 80 mg by mouth every evening, Disp: , Rfl:      tamsulosin (FLOMAX) 0.4 mg, Take 1 capsule (0.4 mg total) by mouth daily with dinner, Disp: 10 capsule, Rfl: 0    warfarin (COUMADIN) 5 mg tablet, Hold x 4 days prior to sx. Starting Lovenox x 3 days prior-, Disp: , Rfl:     Past Medical History:   Diagnosis Date    Aortic stenosis     per pt birth defect    GERD (gastroesophageal reflux disease)     Heart murmur     heart valve replaced    Hyperlipidemia        Past Surgical History:   Procedure Laterality Date    CARDIAC VALVE REPLACEMENT      COLONOSCOPY      HERNIA REPAIR  11/24    [place] RPR 1ST INGUN HRNA AGE 6 YRS/> REDUCIBLE Left 10/25/2024    Procedure: REPAIR HERNIA INGUINAL;  Surgeon: Krystian Hyde DO;  [facility];  Service: General       No family history on file.     reports that he has never smoked. He has never used smokeless tobacco. He reports that he does not drink alcohol and does not use drugs.    PHYSICAL EXAM    There were no vitals taken for this visit.    General: normal, cooperative, no distress  Abdominal: soft, nondistended, or nontender  Incision: clean, dry, and intact and healing well.  Healing ridge as expected.  No signs of recurrence      Krystian Hyde DO    Date: 11/13/2024 Time: 9:45 AM

## 2024-11-22 ENCOUNTER — NEW PATIENT COMPREHENSIVE (OUTPATIENT)
Dept: URBAN - METROPOLITAN AREA CLINIC 6 | Facility: CLINIC | Age: 64
End: 2024-11-22

## 2024-11-22 DIAGNOSIS — H35.373: ICD-10-CM

## 2024-11-22 DIAGNOSIS — H25.813: ICD-10-CM

## 2024-11-22 PROCEDURE — 92004 COMPRE OPH EXAM NEW PT 1/>: CPT

## 2024-11-22 ASSESSMENT — VISUAL ACUITY
OU_CC: J1
OD_PH: 20/30+1
OD_GLARE: 20/100
OD_CC: 20/50
OS_CC: 20/200
OS_PH: 20/50
OS_GLARE: 20/400

## 2024-11-22 ASSESSMENT — TONOMETRY
OD_IOP_MMHG: 10
OS_IOP_MMHG: 11

## 2024-12-26 ENCOUNTER — PRE-OP/ASCAN (OUTPATIENT)
Dept: URBAN - METROPOLITAN AREA CLINIC 6 | Facility: CLINIC | Age: 64
End: 2024-12-26

## 2024-12-26 DIAGNOSIS — H35.373: ICD-10-CM

## 2024-12-26 DIAGNOSIS — H25.813: ICD-10-CM

## 2024-12-26 PROCEDURE — 92134 CPTRZ OPH DX IMG PST SGM RTA: CPT

## 2024-12-26 PROCEDURE — 92012 INTRM OPH EXAM EST PATIENT: CPT

## 2024-12-26 PROCEDURE — 92136 OPHTHALMIC BIOMETRY: CPT

## 2024-12-26 ASSESSMENT — VISUAL ACUITY
OS_PH: 20/50
OD_PH: 20/30+1
OS_GLARE: 20/400
OD_CC: 20/40
OD_GLARE: 20/100
OS_CC: 20/150

## 2024-12-26 ASSESSMENT — KERATOMETRY
OS_K1POWER_DIOPTERS: 43.00
OS_K2POWER_DIOPTERS: 45.00
OD_AXISANGLE2_DEGREES: 8
OS_AXISANGLE2_DEGREES: 177
OD_AXISANGLE_DEGREES: 98
OD_K2POWER_DIOPTERS: 44.75
OS_AXISANGLE_DEGREES: 87
OD_K1POWER_DIOPTERS: 43.25

## 2024-12-26 ASSESSMENT — TONOMETRY
OD_IOP_MMHG: 14
OS_IOP_MMHG: 12

## 2025-01-16 ENCOUNTER — 1 WEEK POST-OP (OUTPATIENT)
Dept: URBAN - METROPOLITAN AREA CLINIC 6 | Facility: CLINIC | Age: 65
End: 2025-01-16

## 2025-01-16 DIAGNOSIS — Z96.1: ICD-10-CM

## 2025-01-16 DIAGNOSIS — H25.811: ICD-10-CM

## 2025-01-16 PROCEDURE — 99024 POSTOP FOLLOW-UP VISIT: CPT

## 2025-01-16 ASSESSMENT — VISUAL ACUITY
OD_CC: 20/30
OS_SC: 20/40+1
OS_PH: 20/25

## 2025-01-16 ASSESSMENT — KERATOMETRY
OD_AXISANGLE2_DEGREES: 8
OS_AXISANGLE_DEGREES: 87
OS_AXISANGLE2_DEGREES: 177
OS_K1POWER_DIOPTERS: 43.00
OD_K2POWER_DIOPTERS: 44.75
OD_K1POWER_DIOPTERS: 43.25
OS_K2POWER_DIOPTERS: 45.00
OD_AXISANGLE_DEGREES: 98

## 2025-01-16 ASSESSMENT — TONOMETRY
OD_IOP_MMHG: 10
OS_IOP_MMHG: 9

## 2025-01-30 ENCOUNTER — 1 WEEK POST-OP (OUTPATIENT)
Dept: URBAN - METROPOLITAN AREA CLINIC 6 | Facility: CLINIC | Age: 65
End: 2025-01-30

## 2025-01-30 DIAGNOSIS — Z96.1: ICD-10-CM

## 2025-01-30 PROCEDURE — 99024 POSTOP FOLLOW-UP VISIT: CPT

## 2025-01-30 ASSESSMENT — TONOMETRY
OD_IOP_MMHG: 11
OS_IOP_MMHG: 11

## 2025-01-30 ASSESSMENT — KERATOMETRY
OS_K2POWER_DIOPTERS: 45.00
OS_AXISANGLE2_DEGREES: 177
OS_AXISANGLE_DEGREES: 87
OD_K1POWER_DIOPTERS: 43.25
OD_AXISANGLE_DEGREES: 98
OD_K2POWER_DIOPTERS: 44.75
OD_AXISANGLE2_DEGREES: 8
OS_K1POWER_DIOPTERS: 43.00

## 2025-01-30 ASSESSMENT — VISUAL ACUITY
OS_SC: 20/50
OD_PH: 20/25
OD_SC: 20/50
OS_PH: 20/30

## 2025-02-27 ENCOUNTER — FOLLOW UP (OUTPATIENT)
Dept: URBAN - METROPOLITAN AREA CLINIC 6 | Facility: CLINIC | Age: 65
End: 2025-02-27

## 2025-02-27 DIAGNOSIS — Z96.1: ICD-10-CM

## 2025-02-27 DIAGNOSIS — H35.373: ICD-10-CM

## 2025-02-27 PROCEDURE — 99024 POSTOP FOLLOW-UP VISIT: CPT

## 2025-02-27 PROCEDURE — 92015 DETERMINE REFRACTIVE STATE: CPT

## 2025-02-27 ASSESSMENT — KERATOMETRY
OD_AXISANGLE_DEGREES: 98
OS_K1POWER_DIOPTERS: 43.00
OS_AXISANGLE_DEGREES: 87
OD_K2POWER_DIOPTERS: 44.75
OD_K1POWER_DIOPTERS: 43.25
OS_K2POWER_DIOPTERS: 45.00
OD_AXISANGLE2_DEGREES: 8
OS_AXISANGLE2_DEGREES: 177

## 2025-02-27 ASSESSMENT — VISUAL ACUITY
OD_SC: 20/50
OS_SC: 20/50

## 2025-02-27 ASSESSMENT — TONOMETRY
OD_IOP_MMHG: 12
OS_IOP_MMHG: 9

## 2025-05-19 ENCOUNTER — TELEPHONE (OUTPATIENT)
Age: 65
End: 2025-05-19

## 2025-05-19 ENCOUNTER — PREP FOR PROCEDURE (OUTPATIENT)
Age: 65
End: 2025-05-19

## 2025-05-19 DIAGNOSIS — Z12.11 SCREENING FOR COLON CANCER: Primary | ICD-10-CM

## 2025-05-19 NOTE — LETTER
Josse Eckert,    Please complete the following Warfarin hold request form for our mutual patient:    Faisal Garirdo 1960    Date of Procedure: 6/9/2025       Type of Procedure: Colonoscopy          Please indicate if patient is able to hold Warfarin prior to procedures, and if so how many days.    Thank you.      Comments:______________________________________________________________________________    _______________________________________________________________________________________    ________________________________________________________________________________________    ________________________________________________________________________________________      ______________________________   __________________________  Physician Signature      Date       Please fax completed response to [phone]. Thank you.

## 2025-05-19 NOTE — LETTER
Attached are your prep instructions for your upcoming procedure on 6/9/2025. If you have any questions or concerns please contact us at [phone].                  Thank you,      [facility]

## 2025-05-19 NOTE — TELEPHONE ENCOUNTER
05/19/25  Screened by: Melinda Bermudez    Referring Provider     Pre- Screening:     There is no height or weight on file to calculate BMI.190lbs 24.39  Has patient been referred for a routine screening Colonoscopy? yes  Is the patient between 45-75 years old? yes      Previous Colonoscopy yes   If yes:    Date:     Facility:     Reason:     Does the patient want to see a Gastroenterologist prior to their procedure OR are they having any GI symptoms? no    Has the patient been hospitalized or had abdominal surgery in the past 6 months? no    Does the patient use supplemental oxygen? no    Does the patient take Coumadin, Lovenox, Plavix, Elliquis, Xarelto, or other blood thinning medication? yes    Has the patient had a stroke, cardiac event, or stent placed in the past year? no       If patient is between 45yrs - 49yrs, please advise patient that we will have to confirm benefits & coverage with their insurance company for a routine screening colonoscopy.

## 2025-05-19 NOTE — TELEPHONE ENCOUNTER
Patients GI provider:      Number to return call: [phone]    Reason for call: Pt's wife called to schedule pt for a colonoscopy. Pt is on warfarin. Per pt's wife PCP does the INR's.    Scheduled procedure/appointment date if applicable:  6/9/2025      Scheduled date of colonoscopy (as of today):6/9/2025  Physician performing colonoscopy:Dr Marcum  Location of colonoscopy:[place]  Bowel prep reviewed with patient:Miralax/Dulcolax  Instructions reviewed with patient by:sent via letter  Clearances: pt is on warfarin

## 2025-05-19 NOTE — LETTER
[facility]  [address]  [place] [address]  Phone#  [phone]  Fax#  [phone]              Dear Dr. Colmenares,    Please complete the following Warfarin hold request form for our mutual patient:    Faisal Garrido 1960    Date of Procedure: 6/9/2025       Type of Procedure: Colonoscopy          Please indicate if patient is able to hold Warfarin prior to procedures, and if so how many days.    Thank you.      Comments:______________________________________________________________________________    _______________________________________________________________________________________    ________________________________________________________________________________________    ________________________________________________________________________________________      ______________________________   __________________________  Physician Signature      Date       Please fax completed response to [phone]. Thank you.

## 2025-05-21 NOTE — TELEPHONE ENCOUNTER
PCP sent back Warfarin hold request, stating Dr. Gianfranco Colmenares, Cardio, follows pt's Warfarin. Sent hold request to Dr. Colmenares.

## 2025-07-31 ENCOUNTER — OFFICE VISIT (OUTPATIENT)
Dept: GASTROENTEROLOGY | Facility: AMBULARY SURGERY CENTER | Age: 65
End: 2025-07-31
Payer: COMMERCIAL

## 2025-07-31 VITALS
HEIGHT: 74 IN | OXYGEN SATURATION: 99 % | BODY MASS INDEX: 25.9 KG/M2 | SYSTOLIC BLOOD PRESSURE: 124 MMHG | DIASTOLIC BLOOD PRESSURE: 64 MMHG | HEART RATE: 77 BPM | WEIGHT: 201.8 LBS

## 2025-07-31 DIAGNOSIS — R74.8 ELEVATED LIVER ENZYMES: ICD-10-CM

## 2025-07-31 DIAGNOSIS — K21.9 GASTROESOPHAGEAL REFLUX DISEASE, UNSPECIFIED WHETHER ESOPHAGITIS PRESENT: ICD-10-CM

## 2025-07-31 DIAGNOSIS — Z12.11 SCREENING FOR COLON CANCER: Primary | ICD-10-CM

## 2025-07-31 PROCEDURE — 99204 OFFICE O/P NEW MOD 45 MIN: CPT

## 2025-07-31 RX ORDER — OMEPRAZOLE 20 MG/1
20 CAPSULE, DELAYED RELEASE ORAL EVERY MORNING
Qty: 90 CAPSULE | Refills: 3 | Status: SHIPPED | OUTPATIENT
Start: 2025-07-31

## 2025-07-31 RX ORDER — ENOXAPARIN SODIUM 100 MG/ML
80 INJECTION SUBCUTANEOUS EVERY 12 HOURS
COMMUNITY
Start: 2025-06-25

## 2025-07-31 RX ORDER — SODIUM CHLORIDE, SODIUM LACTATE, POTASSIUM CHLORIDE, CALCIUM CHLORIDE 600; 310; 30; 20 MG/100ML; MG/100ML; MG/100ML; MG/100ML
125 INJECTION, SOLUTION INTRAVENOUS CONTINUOUS
OUTPATIENT
Start: 2025-07-31

## 2025-09-05 ENCOUNTER — ESTABLISHED COMPREHENSIVE EXAM (OUTPATIENT)
Dept: URBAN - METROPOLITAN AREA CLINIC 6 | Facility: CLINIC | Age: 65
End: 2025-09-05

## 2025-09-05 DIAGNOSIS — Z96.1: ICD-10-CM

## 2025-09-05 DIAGNOSIS — H35.373: ICD-10-CM

## 2025-09-05 PROCEDURE — 92134 CPTRZ OPH DX IMG PST SGM RTA: CPT

## 2025-09-05 PROCEDURE — 92014 COMPRE OPH EXAM EST PT 1/>: CPT

## 2025-09-05 ASSESSMENT — KERATOMETRY
OD_AXISANGLE2_DEGREES: 8
OD_K1POWER_DIOPTERS: 43.25
OS_K2POWER_DIOPTERS: 45.00
OS_AXISANGLE2_DEGREES: 177
OS_AXISANGLE_DEGREES: 87
OD_K2POWER_DIOPTERS: 44.75
OS_K1POWER_DIOPTERS: 43.00
OD_AXISANGLE_DEGREES: 98

## 2025-09-05 ASSESSMENT — TONOMETRY
OS_IOP_MMHG: 10
OD_IOP_MMHG: 10

## 2025-09-05 ASSESSMENT — VISUAL ACUITY
OS_CC: 20/20-1
OD_CC: 20/20-2

## (undated) DEVICE — DECANTER: Brand: UNBRANDED

## (undated) DEVICE — PLUMEPEN PRO 10FT

## (undated) DEVICE — EXOFIN PRECISION PEN HIGH VISCOSITY TOPICAL SKIN ADHESIVE: Brand: EXOFIN PRECISION PEN, 1G

## (undated) DEVICE — GLOVE SRG BIOGEL ORTHOPEDIC 8

## (undated) DEVICE — CHLORAPREP HI-LITE 26ML ORANGE

## (undated) DEVICE — TOWEL SURG XR DETECT GREEN STRL RFD

## (undated) DEVICE — SUT VICRYL 2-0 SH 27 IN UNDYED J417H

## (undated) DEVICE — NEEDLE 23G X 1 1/2 SAFETY-GLIDE THIN WALL

## (undated) DEVICE — BETHLEHEM UNIVERSAL MINOR GEN: Brand: CARDINAL HEALTH

## (undated) DEVICE — SUT MONOCRYL 4-0 PS-2 27 IN Y426H

## (undated) DEVICE — INTENDED FOR TISSUE SEPARATION, AND OTHER PROCEDURES THAT REQUIRE A SHARP SURGICAL BLADE TO PUNCTURE OR CUT.: Brand: BARD-PARKER SAFETY BLADES SIZE 15, STERILE

## (undated) DEVICE — BULB SYRINGE,IRRIGATION WITH PROTECTIVE CAP: Brand: DOVER